# Patient Record
Sex: MALE | Race: WHITE | Employment: FULL TIME | URBAN - METROPOLITAN AREA
[De-identification: names, ages, dates, MRNs, and addresses within clinical notes are randomized per-mention and may not be internally consistent; named-entity substitution may affect disease eponyms.]

---

## 2017-08-06 ENCOUNTER — HOSPITAL ENCOUNTER (OUTPATIENT)
Age: 32
Discharge: HOME | End: 2017-08-06
Payer: COMMERCIAL

## 2017-08-06 VITALS
HEART RATE: 120 BPM | WEIGHT: 200 LBS | DIASTOLIC BLOOD PRESSURE: 106 MMHG | HEIGHT: 65 IN | RESPIRATION RATE: 16 BRPM | SYSTOLIC BLOOD PRESSURE: 138 MMHG | OXYGEN SATURATION: 97 % | BODY MASS INDEX: 33.32 KG/M2 | TEMPERATURE: 100 F

## 2017-08-06 DIAGNOSIS — A35 TETANUS: ICD-10-CM

## 2017-08-06 DIAGNOSIS — K12.1 MOUTH ULCERS: Primary | ICD-10-CM

## 2017-08-06 DIAGNOSIS — B34.9 VIRAL ILLNESS: ICD-10-CM

## 2017-08-06 PROCEDURE — 99203 OFFICE O/P NEW LOW 30 MIN: CPT

## 2017-08-06 PROCEDURE — 99204 OFFICE O/P NEW MOD 45 MIN: CPT

## 2017-08-06 PROCEDURE — 90471 IMMUNIZATION ADMIN: CPT

## 2017-08-06 NOTE — ED PROVIDER NOTES
Patient Seen in: [id number] [facility]    History   Patient presents with:   Body ache and/or chills  Dental Problem (dental)    Stated Complaint: body aches,jaw pain, pt states he stepped on nail-broke skin     42-year-old male who presents to All other systems reviewed and negative except as noted above. PSFH elements reviewed from today and agreed except as otherwise stated in HPI. Physical Exam   ED Triage Vitals [08/06/17 1639]  BP: (!) 138/106  Pulse: 120  Resp: 16  Temp: 99.7 °F (37. I discussed the diagnosis and need for followup with their primary care physician for further evaluation and care. Patient states they understand diagnosis, followup plan and agree with and understand  discharge instructions and plan.  I answered all of th

## 2017-08-06 NOTE — ED INITIAL ASSESSMENT (HPI)
Started on Friday with headache. Having body aches and back pain. Today started with jaw pain to lower jaw bilaterally. Hurts in lower teeth to bite down. Denies fevers. Patient states stepped on nail last week right foot. Patient unsure of last tetanus.

## 2017-08-10 ENCOUNTER — CHARTING TRANS (OUTPATIENT)
Dept: OTHER | Age: 32
End: 2017-08-10

## 2017-08-14 ENCOUNTER — LAB SERVICES (OUTPATIENT)
Dept: OTHER | Age: 32
End: 2017-08-14

## 2017-08-14 ENCOUNTER — IMAGING SERVICES (OUTPATIENT)
Dept: OTHER | Age: 32
End: 2017-08-14

## 2017-08-14 ENCOUNTER — CHARTING TRANS (OUTPATIENT)
Dept: FAMILY MEDICINE | Age: 32
End: 2017-08-14

## 2017-08-14 LAB
ALBUMIN SERPL BCG-MCNC: 4.5 G/DL (ref 3.6–5.1)
ALP SERPL-CCNC: 93 U/L (ref 30–130)
ALT SERPL W/O P-5'-P-CCNC: 86 U/L (ref 17–47)
AST SERPL-CCNC: 31 U/L (ref 14–43)
BILIRUB SERPL-MCNC: 0.7 MG/DL (ref 0–1.3)
BUN SERPL-MCNC: 19 MG/DL (ref 6–27)
CALCIUM SERPL-MCNC: 9.5 MG/DL (ref 8.6–10.6)
CHLORIDE SERPL-SCNC: 104 MMOL/L (ref 96–107)
CREATININE, SERUM: 1 MG/DL (ref 0.6–1.6)
DIFFERENTIAL TYPE: ABNORMAL
GFR SERPL CREATININE-BSD FRML MDRD: >60 ML/MIN/{1.73M2}
GFR SERPL CREATININE-BSD FRML MDRD: >60 ML/MIN/{1.73M2}
GLUCOSE SERPL-MCNC: 105 MG/DL (ref 70–200)
HCO3 SERPL-SCNC: 28 MMOL/L (ref 22–32)
HEMATOCRIT: 49.4 % (ref 40–51)
HEMOGLOBIN: 17.6 G/DL (ref 13.7–17.5)
LYMPH PERCENT: 31.4 % (ref 20.5–51.1)
LYMPHOCYTE ABSOLUTE #: 3 10*3/UL (ref 1.2–3.4)
MEAN CORPUSCULAR HGB CONCENTRATION: 35.6 % (ref 32–36)
MEAN CORPUSCULAR HGB: 29.6 PG (ref 27–34)
MEAN CORPUSCULAR VOLUME: 83.2 FL (ref 79–95)
MEAN PLATELET VOLUME: 11 FL (ref 8.6–12.4)
MIXED %: 12.8 % (ref 4.3–12.9)
MIXED ABSOLUTE #: 1.2 10*3/UL (ref 0.2–0.9)
NEUTROPHIL ABSOLUTE #: 5.3 10*3/UL (ref 1.4–6.5)
NEUTROPHIL PERCENT: 55.8 % (ref 34–73.5)
PLATELET COUNT: 276 10*3/UL (ref 150–400)
POTASSIUM SERPL-SCNC: 4.3 MMOL/L (ref 3.5–5.3)
PROT SERPL-MCNC: 7.6 G/DL (ref 6.4–8.5)
RED BLOOD CELL COUNT: 5.94 10*6/UL (ref 3.9–5.7)
RED CELL DISTRIBUTION WIDTH: 12.9 % (ref 11.3–14.8)
SODIUM SERPL-SCNC: 143 MMOL/L (ref 136–146)
TSH SERPL DL<=0.05 MIU/L-ACNC: 2.07 M[IU]/L (ref 0.3–4.82)
WHITE BLOOD CELL COUNT: 9.5 10*3/UL (ref 4–10)

## 2017-08-15 ENCOUNTER — CHARTING TRANS (OUTPATIENT)
Dept: OTHER | Age: 32
End: 2017-08-15

## 2017-08-16 ENCOUNTER — CHARTING TRANS (OUTPATIENT)
Dept: FAMILY MEDICINE | Age: 32
End: 2017-08-16

## 2017-08-18 ENCOUNTER — IMAGING SERVICES (OUTPATIENT)
Dept: OTHER | Age: 32
End: 2017-08-18

## 2017-08-18 ENCOUNTER — CHARTING TRANS (OUTPATIENT)
Dept: FAMILY MEDICINE | Age: 32
End: 2017-08-18

## 2017-08-19 ENCOUNTER — LAB SERVICES (OUTPATIENT)
Dept: OTHER | Age: 32
End: 2017-08-19

## 2017-08-19 ENCOUNTER — IMAGING SERVICES (OUTPATIENT)
Dept: OTHER | Age: 32
End: 2017-08-19

## 2017-08-19 LAB
ALBUMIN SERPL BCG-MCNC: 4.5 G/DL (ref 3.6–5.1)
ALP SERPL-CCNC: 95 U/L (ref 30–130)
ALT SERPL W/O P-5'-P-CCNC: 59 U/L (ref 17–47)
AST SERPL-CCNC: 29 U/L (ref 14–43)
BILIRUB SERPL-MCNC: 1.2 MG/DL (ref 0–1.3)
BUN SERPL-MCNC: 23 MG/DL (ref 6–27)
CALCIUM SERPL-MCNC: 9.1 MG/DL (ref 8.6–10.6)
CHLORIDE SERPL-SCNC: 105 MMOL/L (ref 96–107)
CHOLEST SERPL-MCNC: 174 MG/DL (ref 140–200)
CREATININE, SERUM: 1.1 MG/DL (ref 0.6–1.6)
DIFFERENTIAL TYPE: NORMAL
GFR SERPL CREATININE-BSD FRML MDRD: >60 ML/MIN/{1.73M2}
GFR SERPL CREATININE-BSD FRML MDRD: >60 ML/MIN/{1.73M2}
GLUCOSE SERPL-MCNC: 92 MG/DL (ref 70–200)
HCO3 SERPL-SCNC: 22 MMOL/L (ref 22–32)
HDLC SERPL-MCNC: 35 MG/DL
HEMATOCRIT: 46.4 % (ref 40–51)
HEMOGLOBIN: 16.5 G/DL (ref 13.7–17.5)
LDLC SERPL CALC-MCNC: 110 MG/DL (ref 30–100)
LYMPH PERCENT: 36.4 % (ref 20.5–51.1)
LYMPHOCYTE ABSOLUTE #: 3.1 10*3/UL (ref 1.2–3.4)
MEAN CORPUSCULAR HGB CONCENTRATION: 35.6 % (ref 32–36)
MEAN CORPUSCULAR HGB: 29.8 PG (ref 27–34)
MEAN CORPUSCULAR VOLUME: 83.9 FL (ref 79–95)
MEAN PLATELET VOLUME: 10.9 FL (ref 8.6–12.4)
MIXED %: 10 % (ref 4.3–12.9)
MIXED ABSOLUTE #: 0.9 10*3/UL (ref 0.2–0.9)
NEUTROPHIL ABSOLUTE #: 4.5 10*3/UL (ref 1.4–6.5)
NEUTROPHIL PERCENT: 53.6 % (ref 34–73.5)
PLATELET COUNT: 298 10*3/UL (ref 150–400)
POTASSIUM SERPL-SCNC: 4.6 MMOL/L (ref 3.5–5.3)
PROT SERPL-MCNC: 7.5 G/DL (ref 6.4–8.5)
RED BLOOD CELL COUNT: 5.53 10*6/UL (ref 3.9–5.7)
RED CELL DISTRIBUTION WIDTH: 12.6 % (ref 11.3–14.8)
SODIUM SERPL-SCNC: 142 MMOL/L (ref 136–146)
TRIGL SERPL-MCNC: 144 MG/DL (ref 0–200)
WHITE BLOOD CELL COUNT: 8.5 10*3/UL (ref 4–10)

## 2017-08-21 LAB
ALBUMIN/CREAT UR: 8 MG/G (ref 0–30)
CREAT UR-MCNC: 316.3 MG/DL
MICROALBUMIN UR-MCNC: 25.5 MG/L

## 2017-09-01 ENCOUNTER — CHARTING TRANS (OUTPATIENT)
Dept: FAMILY MEDICINE | Age: 32
End: 2017-09-01

## 2017-09-01 ENCOUNTER — MYAURORA ACCOUNT LINK (OUTPATIENT)
Dept: OTHER | Age: 32
End: 2017-09-01

## 2017-09-11 ENCOUNTER — CHARTING TRANS (OUTPATIENT)
Dept: FAMILY MEDICINE | Age: 32
End: 2017-09-11

## 2017-09-24 ENCOUNTER — CHARTING TRANS (OUTPATIENT)
Dept: OTHER | Age: 32
End: 2017-09-24

## 2017-11-02 ENCOUNTER — CHARTING TRANS (OUTPATIENT)
Dept: OTHER | Age: 32
End: 2017-11-02

## 2018-03-15 ENCOUNTER — CHARTING TRANS (OUTPATIENT)
Dept: OTHER | Age: 33
End: 2018-03-15

## 2018-03-15 ENCOUNTER — HOSPITAL ENCOUNTER (EMERGENCY)
Age: 33
Discharge: HOME | End: 2018-03-15
Attending: EMERGENCY MEDICINE

## 2018-03-15 VITALS
TEMPERATURE: 97.9 F | RESPIRATION RATE: 16 BRPM | WEIGHT: 200 LBS | DIASTOLIC BLOOD PRESSURE: 111 MMHG | SYSTOLIC BLOOD PRESSURE: 164 MMHG | HEART RATE: 88 BPM | OXYGEN SATURATION: 96 % | BODY MASS INDEX: 33.32 KG/M2 | HEIGHT: 65 IN

## 2018-03-15 DIAGNOSIS — R51.9 NONINTRACTABLE HEADACHE, UNSPECIFIED CHRONICITY PATTERN, UNSPECIFIED HEADACHE TYPE: ICD-10-CM

## 2018-03-15 DIAGNOSIS — R42 DIZZINESS: ICD-10-CM

## 2018-03-15 DIAGNOSIS — R03.0 ELEVATED BLOOD PRESSURE READING: Primary | ICD-10-CM

## 2018-03-15 LAB
ANION GAP BLD CALC-SCNC: 16 MMOL/L
BUN BLD-MCNC: 20 MG/DL (ref 6–20)
CA-I BLD ISE-SCNC: 1.13 MMOL/L (ref 1.15–1.29)
CHLORIDE BLD-SCNC: 104 MMOL/L (ref 98–107)
CO2 BLD-SCNC: 24 MMOL/L (ref 19–24)
CREAT BLD-MCNC: 0.9 MG/DL (ref 0.67–1.17)
CREAT BLD-MCNC: >90 MG/DL
GLUCOSE BLD-MCNC: 93 MG/DL (ref 65–99)
HCT VFR BLD CALC: 48 % (ref 39–51)
HGB BLD-MCNC: 16.3 G/DL (ref 13–17)
POTASSIUM BLD-SCNC: 3.8 MMOL/L (ref 3.4–5.1)
SODIUM BLD-SCNC: 139 MMOL/L (ref 135–145)
TROPONIN I BLD-MCNC: <0.1 NG/ML

## 2018-03-15 PROCEDURE — 36415 COLL VENOUS BLD VENIPUNCTURE: CPT

## 2018-03-15 PROCEDURE — 84484 ASSAY OF TROPONIN QUANT: CPT

## 2018-03-15 PROCEDURE — 10002803 HB RX 637: Performed by: PHYSICIAN ASSISTANT

## 2018-03-15 PROCEDURE — 80047 BASIC METABLC PNL IONIZED CA: CPT

## 2018-03-15 PROCEDURE — 99284 EMERGENCY DEPT VISIT MOD MDM: CPT

## 2018-03-15 RX ORDER — LISINOPRIL 10 MG/1
20 TABLET ORAL ONCE
Status: COMPLETED | OUTPATIENT
Start: 2018-03-15 | End: 2018-03-15

## 2018-03-15 RX ADMIN — LISINOPRIL 20 MG: 10 TABLET ORAL at 10:20

## 2018-03-15 ASSESSMENT — ENCOUNTER SYMPTOMS
DIAPHORESIS: 0
DIZZINESS: 1
DIARRHEA: 0
ABDOMINAL PAIN: 0
NERVOUS/ANXIOUS: 0
VOMITING: 0
CONFUSION: 0
HEADACHES: 1
BLOOD IN STOOL: 0
NUMBNESS: 0
ACTIVITY CHANGE: 0
CHEST TIGHTNESS: 0
EYE REDNESS: 0
FEVER: 0
EYE DISCHARGE: 0
BACK PAIN: 0
NAUSEA: 0
RHINORRHEA: 0
SORE THROAT: 0
SHORTNESS OF BREATH: 0
LIGHT-HEADEDNESS: 1
EYE ITCHING: 0
WEAKNESS: 1
COUGH: 0
VOICE CHANGE: 0

## 2018-03-15 ASSESSMENT — PAIN SCALES - GENERAL: PAINLEVEL_OUTOF10: 6

## 2018-03-15 ASSESSMENT — MOVEMENT AND STRENGTH ASSESSMENTS: FACE_JAW: NO FACIAL DROOP

## 2018-04-06 ENCOUNTER — CHARTING TRANS (OUTPATIENT)
Dept: OTHER | Age: 33
End: 2018-04-06

## 2018-04-06 ENCOUNTER — MYAURORA ACCOUNT LINK (OUTPATIENT)
Dept: OTHER | Age: 33
End: 2018-04-06

## 2018-04-06 ENCOUNTER — LAB SERVICES (OUTPATIENT)
Dept: OTHER | Age: 33
End: 2018-04-06

## 2018-04-06 LAB
ALBUMIN SERPL BCG-MCNC: 4.7 G/DL (ref 3.6–5.1)
ALP SERPL-CCNC: 84 U/L (ref 30–130)
ALT SERPL W/O P-5'-P-CCNC: 69 U/L (ref 17–47)
AST SERPL-CCNC: 33 U/L (ref 14–43)
BILIRUB SERPL-MCNC: 0.7 MG/DL (ref 0–1.3)
BUN SERPL-MCNC: 20 MG/DL (ref 6–27)
CALCIUM SERPL-MCNC: 9.9 MG/DL (ref 8.6–10.6)
CHLORIDE SERPL-SCNC: 102 MMOL/L (ref 96–107)
CREATININE, SERUM: 0.9 MG/DL (ref 0.6–1.6)
GFR SERPL CREATININE-BSD FRML MDRD: >60 ML/MIN/{1.73M2}
GFR SERPL CREATININE-BSD FRML MDRD: >60 ML/MIN/{1.73M2}
GLUCOSE SERPL-MCNC: 92 MG/DL (ref 70–200)
HCO3 SERPL-SCNC: 26 MMOL/L (ref 22–32)
POTASSIUM SERPL-SCNC: 4.3 MMOL/L (ref 3.5–5.3)
PROT SERPL-MCNC: 7.3 G/DL (ref 6.4–8.5)
SODIUM SERPL-SCNC: 141 MMOL/L (ref 136–146)

## 2018-04-16 ENCOUNTER — CHARTING TRANS (OUTPATIENT)
Dept: OTHER | Age: 33
End: 2018-04-16

## 2018-06-05 ENCOUNTER — CHARTING TRANS (OUTPATIENT)
Dept: OTHER | Age: 33
End: 2018-06-05

## 2018-06-05 ENCOUNTER — MYAURORA ACCOUNT LINK (OUTPATIENT)
Dept: OTHER | Age: 33
End: 2018-06-05

## 2018-06-05 ASSESSMENT — PAIN SCALES - GENERAL: PAINLEVEL_OUTOF10: 5

## 2018-06-06 ENCOUNTER — CHARTING TRANS (OUTPATIENT)
Dept: OTHER | Age: 33
End: 2018-06-06

## 2018-11-13 ENCOUNTER — CHARTING TRANS (OUTPATIENT)
Dept: OTHER | Age: 33
End: 2018-11-13

## 2018-11-23 ENCOUNTER — IMAGING SERVICES (OUTPATIENT)
Dept: OTHER | Age: 33
End: 2018-11-23

## 2018-11-28 VITALS
RESPIRATION RATE: 16 BRPM | DIASTOLIC BLOOD PRESSURE: 86 MMHG | HEART RATE: 72 BPM | HEIGHT: 64 IN | WEIGHT: 205 LBS | TEMPERATURE: 97.3 F | BODY MASS INDEX: 35 KG/M2 | SYSTOLIC BLOOD PRESSURE: 140 MMHG

## 2018-11-28 VITALS
SYSTOLIC BLOOD PRESSURE: 136 MMHG | RESPIRATION RATE: 18 BRPM | TEMPERATURE: 97.9 F | WEIGHT: 204 LBS | HEART RATE: 116 BPM | HEIGHT: 64 IN | DIASTOLIC BLOOD PRESSURE: 92 MMHG | BODY MASS INDEX: 34.83 KG/M2

## 2018-11-28 VITALS
HEART RATE: 116 BPM | WEIGHT: 205 LBS | BODY MASS INDEX: 35 KG/M2 | TEMPERATURE: 97.4 F | RESPIRATION RATE: 16 BRPM | SYSTOLIC BLOOD PRESSURE: 152 MMHG | HEIGHT: 64 IN | DIASTOLIC BLOOD PRESSURE: 110 MMHG

## 2018-11-28 VITALS
TEMPERATURE: 97.5 F | RESPIRATION RATE: 16 BRPM | HEART RATE: 68 BPM | DIASTOLIC BLOOD PRESSURE: 102 MMHG | WEIGHT: 202 LBS | SYSTOLIC BLOOD PRESSURE: 138 MMHG

## 2018-11-28 VITALS
SYSTOLIC BLOOD PRESSURE: 134 MMHG | DIASTOLIC BLOOD PRESSURE: 94 MMHG | WEIGHT: 206 LBS | HEART RATE: 80 BPM | TEMPERATURE: 98.1 F | RESPIRATION RATE: 18 BRPM

## 2019-01-24 ENCOUNTER — IMAGING SERVICES (OUTPATIENT)
Dept: OTHER | Age: 34
End: 2019-01-24

## 2019-02-26 RX ORDER — LISINOPRIL 20 MG/1
TABLET ORAL
Qty: 30 TABLET | Refills: 1 | Status: SHIPPED | OUTPATIENT
Start: 2019-02-26 | End: 2019-04-29 | Stop reason: ALTCHOICE

## 2019-03-05 VITALS
WEIGHT: 205 LBS | HEART RATE: 94 BPM | SYSTOLIC BLOOD PRESSURE: 138 MMHG | BODY MASS INDEX: 34.11 KG/M2 | OXYGEN SATURATION: 97 % | TEMPERATURE: 98.3 F | DIASTOLIC BLOOD PRESSURE: 98 MMHG

## 2019-03-05 VITALS
OXYGEN SATURATION: 96 % | TEMPERATURE: 97.4 F | RESPIRATION RATE: 16 BRPM | DIASTOLIC BLOOD PRESSURE: 90 MMHG | SYSTOLIC BLOOD PRESSURE: 138 MMHG | HEART RATE: 82 BPM

## 2019-03-06 VITALS
WEIGHT: 203 LBS | TEMPERATURE: 97.5 F | SYSTOLIC BLOOD PRESSURE: 122 MMHG | HEART RATE: 72 BPM | BODY MASS INDEX: 33.78 KG/M2 | DIASTOLIC BLOOD PRESSURE: 94 MMHG | RESPIRATION RATE: 16 BRPM

## 2019-04-29 ENCOUNTER — LAB SERVICES (OUTPATIENT)
Dept: LAB | Age: 34
End: 2019-04-29

## 2019-04-29 ENCOUNTER — OFFICE VISIT (OUTPATIENT)
Dept: INTERNAL MEDICINE | Age: 34
End: 2019-04-29

## 2019-04-29 VITALS
TEMPERATURE: 98.1 F | HEART RATE: 62 BPM | WEIGHT: 228.2 LBS | BODY MASS INDEX: 38.02 KG/M2 | RESPIRATION RATE: 16 BRPM | DIASTOLIC BLOOD PRESSURE: 96 MMHG | SYSTOLIC BLOOD PRESSURE: 128 MMHG | HEIGHT: 65 IN

## 2019-04-29 DIAGNOSIS — R42 DIZZINESS: ICD-10-CM

## 2019-04-29 DIAGNOSIS — R51.9 NONINTRACTABLE HEADACHE, UNSPECIFIED CHRONICITY PATTERN, UNSPECIFIED HEADACHE TYPE: Primary | ICD-10-CM

## 2019-04-29 DIAGNOSIS — R51.9 NONINTRACTABLE HEADACHE, UNSPECIFIED CHRONICITY PATTERN, UNSPECIFIED HEADACHE TYPE: ICD-10-CM

## 2019-04-29 LAB
ALBUMIN SERPL-MCNC: 4.7 G/DL (ref 3.6–5.1)
ALP SERPL-CCNC: 70 U/L (ref 45–115)
ALT SERPL W/O P-5'-P-CCNC: 114 U/L (ref 5–49)
AST SERPL-CCNC: 55 U/L (ref 14–43)
BILIRUB SERPL-MCNC: 0.6 MG/DL (ref 0–1.3)
BUN SERPL-MCNC: 19 MG/DL (ref 6–27)
CALCIUM SERPL-MCNC: 9.6 MG/DL (ref 8.6–10.6)
CHLORIDE SERPL-SCNC: 105 MMOL/L (ref 96–107)
CO2 SERPL-SCNC: 26 MMOL/L (ref 22–32)
CREAT SERPL-MCNC: 1 MG/DL (ref 0.6–1.6)
GFR SERPL CREATININE-BSD FRML MDRD: >60 ML/MIN/{1.73M2}
GFR SERPL CREATININE-BSD FRML MDRD: >60 ML/MIN/{1.73M2}
GLUCOSE SERPL-MCNC: 100 MG/DL (ref 70–200)
HEMATOCRIT: 47.7 % (ref 40–51)
HEMOGLOBIN: 16.9 G/DL (ref 13.7–17.5)
MEAN CORPUSCULAR HGB CONCENTRATION: 35.4 % (ref 32–36)
MEAN CORPUSCULAR HGB: 30.6 PG (ref 27–34)
MEAN CORPUSCULAR VOLUME: 86.4 FL (ref 79–95)
MEAN PLATELET VOLUME: 11.6 FL (ref 8.6–12.4)
PLATELET COUNT: 196 10*3/UL (ref 150–400)
POTASSIUM SERPL-SCNC: 4.5 MMOL/L (ref 3.5–5.3)
PROT SERPL-MCNC: 7.4 G/DL (ref 6.4–8.5)
RED BLOOD CELL COUNT: 5.52 10*6/UL (ref 3.9–5.7)
RED CELL DISTRIBUTION WIDTH: 13.1 % (ref 11.3–14.8)
SODIUM SERPL-SCNC: 141 MMOL/L (ref 136–146)
WHITE BLOOD CELL COUNT: 6.9 10*3/UL (ref 4–10)

## 2019-04-29 PROCEDURE — 99203 OFFICE O/P NEW LOW 30 MIN: CPT | Performed by: INTERNAL MEDICINE

## 2019-04-29 PROCEDURE — 36415 COLL VENOUS BLD VENIPUNCTURE: CPT | Performed by: INTERNAL MEDICINE

## 2019-04-29 PROCEDURE — 85027 COMPLETE CBC AUTOMATED: CPT | Performed by: INTERNAL MEDICINE

## 2019-04-29 PROCEDURE — 80053 COMPREHEN METABOLIC PANEL: CPT | Performed by: INTERNAL MEDICINE

## 2019-04-29 RX ORDER — ESCITALOPRAM OXALATE 10 MG/1
10 TABLET ORAL
COMMUNITY
Start: 2018-04-06 | End: 2019-06-28 | Stop reason: SDUPTHER

## 2019-04-29 RX ORDER — HYDROCHLOROTHIAZIDE 12.5 MG/1
CAPSULE, GELATIN COATED ORAL
COMMUNITY
Start: 2018-04-06 | End: 2019-06-28 | Stop reason: SDUPTHER

## 2019-04-29 RX ORDER — LISINOPRIL 20 MG/1
TABLET ORAL
COMMUNITY
Start: 2018-04-06 | End: 2019-09-14 | Stop reason: SDUPTHER

## 2019-04-29 ASSESSMENT — PATIENT HEALTH QUESTIONNAIRE - PHQ9
SUM OF ALL RESPONSES TO PHQ9 QUESTIONS 1 TO 9: 6
SUM OF ALL RESPONSES TO PHQ9 QUESTIONS 1 AND 2: 1
7. TROUBLE CONCENTRATING ON THINGS, SUCH AS READING THE NEWSPAPER OR WATCHING TELEVISION: NOT AT ALL
SUM OF ALL RESPONSES TO PHQ QUESTIONS 1-9: 6
3. TROUBLE FALLING OR STAYING ASLEEP OR SLEEPING TOO MUCH: SEVERAL DAYS
4. FEELING TIRED OR HAVING LITTLE ENERGY: SEVERAL DAYS
CLINICAL INTERPRETATION OF PHQ9 SCORE: MILD DEPRESSION
9. THOUGHTS THAT YOU WOULD BE BETTER OFF DEAD, OR OF HURTING YOURSELF: NOT AT ALL
1. LITTLE INTEREST OR PLEASURE IN DOING THINGS: SEVERAL DAYS
5. POOR APPETITE, WEIGHT LOSS, OR OVEREATING: MORE THAN HALF THE DAYS
2. FEELING DOWN, DEPRESSED OR HOPELESS: NOT AT ALL
SUM OF ALL RESPONSES TO PHQ9 QUESTIONS 1 AND 2: 1
8. MOVING OR SPEAKING SO SLOWLY THAT OTHER PEOPLE COULD HAVE NOTICED. OR THE OPPOSITE, BEING SO FIGETY OR RESTLESS THAT YOU HAVE BEEN MOVING AROUND A LOT MORE THAN USUAL: SEVERAL DAYS
6. FEELING BAD ABOUT YOURSELF - OR THAT YOU ARE A FAILURE OR HAVE LET YOURSELF OR YOUR FAMILY DOWN: NOT AT ALL

## 2019-04-29 ASSESSMENT — ANXIETY QUESTIONNAIRES
3. WORRYING TOO MUCH ABOUT DIFFERENT THINGS: NOT AT ALL
6. BECOMING EASILY ANNOYED OR IRRITABLE: 1
4. TROUBLE RELAXING: SEVERAL DAYS
6. BECOMING EASILY ANNOYED OR IRRITABLE: SEVERAL DAYS
5. BEING SO RESTLESS THAT IT IS HARD TO SIT STILL: 1
2. NOT BEING ABLE TO STOP OR CONTROL WORRYING: 0
3. WORRYING TOO MUCH ABOUT DIFFERENT THINGS: 0
2. NOT BEING ABLE TO STOP OR CONTROL WORRYING: NOT AT ALL
7. FEELING AFRAID AS IF SOMETHING AWFUL MIGHT HAPPEN: 0
1. FEELING NERVOUS, ANXIOUS, OR ON EDGE: 1
5. BEING SO RESTLESS THAT IT IS HARD TO SIT STILL: SEVERAL DAYS
GAD7 TOTAL SCORE: 4
7. FEELING AFRAID AS IF SOMETHING AWFUL MIGHT HAPPEN: NOT AT ALL
4. TROUBLE RELAXING: 1
1. FEELING NERVOUS, ANXIOUS, OR ON EDGE: SEVERAL DAYS

## 2019-05-01 ENCOUNTER — TELEPHONE (OUTPATIENT)
Dept: FAMILY MEDICINE | Age: 34
End: 2019-05-01

## 2019-08-05 RX ORDER — HYDROCHLOROTHIAZIDE 12.5 MG/1
CAPSULE, GELATIN COATED ORAL
Qty: 90 CAPSULE | Refills: 0 | Status: SHIPPED | OUTPATIENT
Start: 2019-08-05 | End: 2019-09-14 | Stop reason: DRUGHIGH

## 2019-08-05 RX ORDER — ESCITALOPRAM OXALATE 10 MG/1
TABLET ORAL
Qty: 90 TABLET | Refills: 0 | Status: SHIPPED | OUTPATIENT
Start: 2019-08-05 | End: 2020-11-07 | Stop reason: SDUPTHER

## 2019-08-07 RX ORDER — LISINOPRIL 20 MG/1
TABLET ORAL
Qty: 30 TABLET | Refills: 0 | Status: SHIPPED | OUTPATIENT
Start: 2019-08-07 | End: 2019-08-30 | Stop reason: SDUPTHER

## 2019-08-29 ENCOUNTER — APPOINTMENT (OUTPATIENT)
Dept: INTERNAL MEDICINE | Age: 34
End: 2019-08-29

## 2019-08-30 RX ORDER — LISINOPRIL 20 MG/1
TABLET ORAL
Qty: 30 TABLET | Refills: 2 | Status: SHIPPED | OUTPATIENT
Start: 2019-08-30 | End: 2019-09-14 | Stop reason: SDUPTHER

## 2019-09-14 ENCOUNTER — OFFICE VISIT (OUTPATIENT)
Dept: INTERNAL MEDICINE | Age: 34
End: 2019-09-14

## 2019-09-14 VITALS
RESPIRATION RATE: 18 BRPM | HEART RATE: 82 BPM | TEMPERATURE: 97.7 F | BODY MASS INDEX: 38.15 KG/M2 | WEIGHT: 229 LBS | SYSTOLIC BLOOD PRESSURE: 152 MMHG | DIASTOLIC BLOOD PRESSURE: 104 MMHG | HEIGHT: 65 IN

## 2019-09-14 DIAGNOSIS — I10 ESSENTIAL HYPERTENSION: ICD-10-CM

## 2019-09-14 DIAGNOSIS — E66.01 CLASS 2 SEVERE OBESITY DUE TO EXCESS CALORIES WITH SERIOUS COMORBIDITY AND BODY MASS INDEX (BMI) OF 38.0 TO 38.9 IN ADULT (CMD): ICD-10-CM

## 2019-09-14 DIAGNOSIS — Z00.01 ENCOUNTER FOR GENERAL ADULT MEDICAL EXAMINATION WITH ABNORMAL FINDINGS: Primary | ICD-10-CM

## 2019-09-14 DIAGNOSIS — R06.83 SNORING: ICD-10-CM

## 2019-09-14 DIAGNOSIS — R01.1 HEART MURMUR: ICD-10-CM

## 2019-09-14 PROCEDURE — 99395 PREV VISIT EST AGE 18-39: CPT | Performed by: INTERNAL MEDICINE

## 2019-09-14 PROCEDURE — 99214 OFFICE O/P EST MOD 30 MIN: CPT | Performed by: INTERNAL MEDICINE

## 2019-09-14 RX ORDER — LISINOPRIL 40 MG/1
40 TABLET ORAL DAILY
Qty: 90 TABLET | Refills: 1 | Status: SHIPPED | OUTPATIENT
Start: 2019-09-14 | End: 2020-11-07 | Stop reason: SDUPTHER

## 2019-09-14 RX ORDER — HYDROCHLOROTHIAZIDE 25 MG/1
25 TABLET ORAL DAILY
Qty: 90 TABLET | Refills: 1 | Status: SHIPPED | OUTPATIENT
Start: 2019-09-14 | End: 2020-11-07 | Stop reason: SDUPTHER

## 2019-09-14 SDOH — HEALTH STABILITY: MENTAL HEALTH: HOW OFTEN DO YOU HAVE 6 OR MORE DRINKS ON ONE OCCASION?: LESS THAN MONTHLY

## 2019-10-04 ENCOUNTER — OFFICE VISIT (OUTPATIENT)
Dept: PULMONOLOGY | Age: 34
End: 2019-10-04
Attending: INTERNAL MEDICINE

## 2019-10-04 VITALS
DIASTOLIC BLOOD PRESSURE: 92 MMHG | RESPIRATION RATE: 18 BRPM | HEIGHT: 65 IN | TEMPERATURE: 99.2 F | OXYGEN SATURATION: 97 % | SYSTOLIC BLOOD PRESSURE: 142 MMHG | HEART RATE: 98 BPM | BODY MASS INDEX: 38.22 KG/M2 | WEIGHT: 229.4 LBS

## 2019-10-04 DIAGNOSIS — R06.83 SNORING: ICD-10-CM

## 2019-10-04 PROCEDURE — 99243 OFF/OP CNSLTJ NEW/EST LOW 30: CPT | Performed by: INTERNAL MEDICINE

## 2019-10-04 ASSESSMENT — SLEEP AND FATIGUE QUESTIONNAIRES
HOW LIKELY ARE YOU TO NOD OFF OR FALL ASLEEP WHILE SITTING AND READING: WOULD NEVER DOZE
HOW LIKELY ARE YOU TO NOD OFF OR FALL ASLEEP WHILE WATCHING TV: HIGH CHANCE OF DOZING
HOW LIKELY ARE YOU TO NOD OFF OR FALL ASLEEP IN A CAR, WHILE STOPPED FOR A FEW MINUTES IN TRAFFIC: WOULD NEVER DOZE
HOW LIKELY ARE YOU TO NOD OFF OR FALL ASLEEP WHILE SITTING QUIETLY AFTER LUNCH WITHOUT ALCOHOL: WOULD NEVER DOZE
HOW LIKELY ARE YOU TO NOD OFF OR FALL ASLEEP WHEN YOU ARE A PASSENGER IN A CAR FOR AN HOUR WITHOUT A BREAK: WOULD NEVER DOZE
HOW LIKELY ARE YOU TO NOD OFF OR FALL ASLEEP WHILE SITTING AND TALKING TO SOMEONE: WOULD NEVER DOZE
HOW LIKELY ARE YOU TO NOD OFF OR FALL ASLEEP WHILE SITTING INACTIVE IN A PUBLIC PLACE: WOULD NEVER DOZE
HOW LIKELY ARE YOU TO NOD OFF OR FALL ASLEEP WHILE LYING DOWN TO REST IN THE AFTERNOON WHEN CIRCUMSTANCES PERMIT: HIGH CHANCE OF DOZING
ESS TOTAL SCORE: 6

## 2019-10-10 DIAGNOSIS — G47.33 OBSTRUCTIVE SLEEP APNEA: Primary | ICD-10-CM

## 2019-10-30 ENCOUNTER — ANCILLARY PROCEDURE (OUTPATIENT)
Dept: CARDIOLOGY | Age: 34
End: 2019-10-30
Attending: INTERNAL MEDICINE

## 2019-10-30 DIAGNOSIS — R01.1 HEART MURMUR: ICD-10-CM

## 2019-10-30 PROCEDURE — 93306 TTE W/DOPPLER COMPLETE: CPT | Performed by: INTERNAL MEDICINE

## 2019-11-19 ENCOUNTER — OFFICE VISIT (OUTPATIENT)
Dept: INTERNAL MEDICINE | Age: 34
End: 2019-11-19

## 2019-11-19 VITALS
SYSTOLIC BLOOD PRESSURE: 140 MMHG | OXYGEN SATURATION: 97 % | TEMPERATURE: 97.5 F | HEART RATE: 98 BPM | DIASTOLIC BLOOD PRESSURE: 86 MMHG | RESPIRATION RATE: 20 BRPM

## 2019-11-19 DIAGNOSIS — J40 BRONCHITIS: Primary | ICD-10-CM

## 2019-11-19 PROCEDURE — 99214 OFFICE O/P EST MOD 30 MIN: CPT | Performed by: PHYSICIAN ASSISTANT

## 2019-11-19 RX ORDER — CODEINE PHOSPHATE AND GUAIFENESIN 10; 100 MG/5ML; MG/5ML
5 SOLUTION ORAL 3 TIMES DAILY PRN
Qty: 120 ML | Refills: 0 | Status: SHIPPED | OUTPATIENT
Start: 2019-11-19 | End: 2020-11-07 | Stop reason: ALTCHOICE

## 2019-11-19 RX ORDER — AZITHROMYCIN 250 MG/1
TABLET, FILM COATED ORAL
Qty: 6 TABLET | Refills: 0 | Status: SHIPPED | OUTPATIENT
Start: 2019-11-19 | End: 2019-11-24

## 2019-11-19 SDOH — HEALTH STABILITY: MENTAL HEALTH: HOW OFTEN DO YOU HAVE 6 OR MORE DRINKS ON ONE OCCASION?: LESS THAN MONTHLY

## 2020-10-15 ENCOUNTER — OFFICE VISIT (OUTPATIENT)
Dept: INTERNAL MEDICINE | Age: 35
End: 2020-10-15

## 2020-10-15 VITALS — TEMPERATURE: 97.7 F | SYSTOLIC BLOOD PRESSURE: 164 MMHG | HEART RATE: 80 BPM | DIASTOLIC BLOOD PRESSURE: 98 MMHG

## 2020-10-15 DIAGNOSIS — M54.41 ACUTE BILATERAL LOW BACK PAIN WITH RIGHT-SIDED SCIATICA: Primary | ICD-10-CM

## 2020-10-15 PROCEDURE — 99214 OFFICE O/P EST MOD 30 MIN: CPT | Performed by: PHYSICIAN ASSISTANT

## 2020-10-15 RX ORDER — PREDNISONE 50 MG/1
50 TABLET ORAL DAILY
Qty: 5 TABLET | Refills: 0 | Status: SHIPPED | OUTPATIENT
Start: 2020-10-15 | End: 2020-11-07 | Stop reason: ALTCHOICE

## 2020-10-15 ASSESSMENT — PATIENT HEALTH QUESTIONNAIRE - PHQ9
SUM OF ALL RESPONSES TO PHQ9 QUESTIONS 1 AND 2: 0
CLINICAL INTERPRETATION OF PHQ2 SCORE: NO FURTHER SCREENING NEEDED
1. LITTLE INTEREST OR PLEASURE IN DOING THINGS: NOT AT ALL
SUM OF ALL RESPONSES TO PHQ9 QUESTIONS 1 AND 2: 0
CLINICAL INTERPRETATION OF PHQ9 SCORE: NO FURTHER SCREENING NEEDED
2. FEELING DOWN, DEPRESSED OR HOPELESS: NOT AT ALL

## 2020-11-03 PROBLEM — G89.29 CHRONIC RIGHT-SIDED LOW BACK PAIN WITH RIGHT-SIDED SCIATICA: Status: ACTIVE | Noted: 2020-11-03

## 2020-11-03 PROBLEM — M62.89 MUSCLE TIGHTNESS: Status: ACTIVE | Noted: 2020-11-03

## 2020-11-03 PROBLEM — M54.41 CHRONIC RIGHT-SIDED LOW BACK PAIN WITH RIGHT-SIDED SCIATICA: Status: ACTIVE | Noted: 2020-11-03

## 2020-11-03 PROBLEM — M62.81 MUSCLE WEAKNESS: Status: ACTIVE | Noted: 2020-11-03

## 2020-11-07 ENCOUNTER — OFFICE VISIT (OUTPATIENT)
Dept: INTERNAL MEDICINE | Age: 35
End: 2020-11-07

## 2020-11-07 ENCOUNTER — LAB SERVICES (OUTPATIENT)
Dept: LAB | Age: 35
End: 2020-11-07

## 2020-11-07 VITALS
HEART RATE: 58 BPM | SYSTOLIC BLOOD PRESSURE: 156 MMHG | RESPIRATION RATE: 18 BRPM | HEIGHT: 65 IN | WEIGHT: 223.7 LBS | TEMPERATURE: 97.7 F | DIASTOLIC BLOOD PRESSURE: 100 MMHG | BODY MASS INDEX: 37.27 KG/M2

## 2020-11-07 DIAGNOSIS — E66.01 CLASS 2 SEVERE OBESITY DUE TO EXCESS CALORIES WITH SERIOUS COMORBIDITY AND BODY MASS INDEX (BMI) OF 37.0 TO 37.9 IN ADULT (CMD): ICD-10-CM

## 2020-11-07 DIAGNOSIS — I10 ESSENTIAL HYPERTENSION: ICD-10-CM

## 2020-11-07 DIAGNOSIS — R06.83 SNORING: ICD-10-CM

## 2020-11-07 DIAGNOSIS — Z00.00 HEALTHCARE MAINTENANCE: Primary | ICD-10-CM

## 2020-11-07 DIAGNOSIS — F41.8 DEPRESSION WITH ANXIETY: ICD-10-CM

## 2020-11-07 DIAGNOSIS — Z00.00 HEALTHCARE MAINTENANCE: ICD-10-CM

## 2020-11-07 PROBLEM — E78.6 LOW HDL (UNDER 40): Status: ACTIVE | Noted: 2017-09-11

## 2020-11-07 PROBLEM — R74.8 ELEVATED LIVER ENZYMES: Status: ACTIVE | Noted: 2017-09-11

## 2020-11-07 LAB
ALBUMIN SERPL-MCNC: 4.6 G/DL (ref 3.6–5.1)
ALP SERPL-CCNC: 103 U/L (ref 45–115)
ALT SERPL W/O P-5'-P-CCNC: 92 U/L (ref 5–49)
AST SERPL-CCNC: 54 U/L (ref 14–43)
BILIRUB SERPL-MCNC: 1.1 MG/DL (ref 0–1.3)
BUN SERPL-MCNC: 15 MG/DL (ref 6–27)
CALCIUM SERPL-MCNC: 9.3 MG/DL (ref 8.6–10.6)
CHLORIDE SERPL-SCNC: 103 MMOL/L (ref 96–107)
CHOLEST SERPL-MCNC: 216 MG/DL (ref 140–200)
CO2 SERPL-SCNC: 27 MMOL/L (ref 22–32)
CREAT SERPL-MCNC: 1 MG/DL (ref 0.6–1.6)
EST. AVERAGE GLUCOSE BLD GHB EST-MCNC: 100 MG/DL (ref 0–154)
GFR SERPL CREATININE-BSD FRML MDRD: >60 ML/MIN/{1.73M2}
GFR SERPL CREATININE-BSD FRML MDRD: >60 ML/MIN/{1.73M2}
GLUCOSE P FAST SERPL-MCNC: 96 MG/DL (ref 60–100)
HBA1C MFR BLD: 5.1 % (ref 4.2–6)
HDLC SERPL-MCNC: 43 MG/DL
HEMATOCRIT: 50.1 % (ref 40–51)
HEMOGLOBIN: 17.5 G/DL (ref 13.7–17.5)
LDLC SERPL CALC-MCNC: 142 MG/DL (ref 30–100)
MEAN CORPUSCULAR HGB CONCENTRATION: 34.9 % (ref 32–36)
MEAN CORPUSCULAR HGB: 30.2 PG (ref 27–34)
MEAN CORPUSCULAR VOLUME: 86.4 FL (ref 79–95)
MEAN PLATELET VOLUME: 12.1 FL (ref 8.6–12.4)
PLATELET COUNT: 282 10*3/UL (ref 150–400)
POTASSIUM SERPL-SCNC: 3.7 MMOL/L (ref 3.5–5.3)
PROT SERPL-MCNC: 8 G/DL (ref 6.4–8.5)
RED BLOOD CELL COUNT: 5.8 10*6/UL (ref 3.9–5.7)
RED CELL DISTRIBUTION WIDTH: 12.8 % (ref 11.3–14.8)
SODIUM SERPL-SCNC: 139 MMOL/L (ref 136–146)
TRIGL SERPL-MCNC: 155 MG/DL (ref 0–200)
TSH SERPL DL<=0.05 MIU/L-ACNC: 1.64 M[IU]/L (ref 0.3–4.82)
WHITE BLOOD CELL COUNT: 8.3 10*3/UL (ref 4–10)

## 2020-11-07 PROCEDURE — 85027 COMPLETE CBC AUTOMATED: CPT | Performed by: INTERNAL MEDICINE

## 2020-11-07 PROCEDURE — 80053 COMPREHEN METABOLIC PANEL: CPT | Performed by: INTERNAL MEDICINE

## 2020-11-07 PROCEDURE — 83036 HEMOGLOBIN GLYCOSYLATED A1C: CPT | Performed by: INTERNAL MEDICINE

## 2020-11-07 PROCEDURE — 84443 ASSAY THYROID STIM HORMONE: CPT | Performed by: INTERNAL MEDICINE

## 2020-11-07 PROCEDURE — 3080F DIAST BP >= 90 MM HG: CPT | Performed by: INTERNAL MEDICINE

## 2020-11-07 PROCEDURE — 80061 LIPID PANEL: CPT | Performed by: INTERNAL MEDICINE

## 2020-11-07 PROCEDURE — 99395 PREV VISIT EST AGE 18-39: CPT | Performed by: INTERNAL MEDICINE

## 2020-11-07 PROCEDURE — 36415 COLL VENOUS BLD VENIPUNCTURE: CPT | Performed by: INTERNAL MEDICINE

## 2020-11-07 PROCEDURE — 3077F SYST BP >= 140 MM HG: CPT | Performed by: INTERNAL MEDICINE

## 2020-11-07 RX ORDER — LISINOPRIL 40 MG/1
40 TABLET ORAL DAILY
Qty: 90 TABLET | Refills: 1 | Status: SHIPPED | OUTPATIENT
Start: 2020-11-07 | End: 2022-02-16 | Stop reason: SINTOL

## 2020-11-07 RX ORDER — HYDROCHLOROTHIAZIDE 25 MG/1
25 TABLET ORAL DAILY
Qty: 90 TABLET | Refills: 1 | Status: SHIPPED | OUTPATIENT
Start: 2020-11-07 | End: 2022-02-16 | Stop reason: SINTOL

## 2020-11-07 RX ORDER — ESCITALOPRAM OXALATE 10 MG/1
10 TABLET ORAL DAILY
Qty: 90 TABLET | Refills: 0 | Status: SHIPPED | OUTPATIENT
Start: 2020-11-07 | End: 2022-02-16 | Stop reason: SINTOL

## 2020-11-07 SDOH — HEALTH STABILITY: MENTAL HEALTH: HOW OFTEN DO YOU HAVE 6 OR MORE DRINKS ON ONE OCCASION?: LESS THAN MONTHLY

## 2020-11-07 SDOH — HEALTH STABILITY: MENTAL HEALTH: HOW OFTEN DO YOU HAVE A DRINK CONTAINING ALCOHOL?: 2-4 TIMES A MONTH

## 2020-11-10 ENCOUNTER — APPOINTMENT (OUTPATIENT)
Dept: PHYSICAL THERAPY | Age: 35
End: 2020-11-10
Attending: PHYSICIAN ASSISTANT

## 2020-11-12 ENCOUNTER — APPOINTMENT (OUTPATIENT)
Dept: PHYSICAL THERAPY | Age: 35
End: 2020-11-12

## 2022-02-15 ENCOUNTER — TELEPHONE (OUTPATIENT)
Dept: INTERNAL MEDICINE | Age: 37
End: 2022-02-15

## 2022-02-16 ENCOUNTER — APPOINTMENT (OUTPATIENT)
Dept: INTERNAL MEDICINE | Age: 37
End: 2022-02-16

## 2022-02-16 ENCOUNTER — OFFICE VISIT (OUTPATIENT)
Dept: INTERNAL MEDICINE | Age: 37
End: 2022-02-16

## 2022-02-16 VITALS
HEIGHT: 65 IN | DIASTOLIC BLOOD PRESSURE: 105 MMHG | WEIGHT: 228.2 LBS | OXYGEN SATURATION: 98 % | RESPIRATION RATE: 20 BRPM | BODY MASS INDEX: 38.02 KG/M2 | HEART RATE: 112 BPM | SYSTOLIC BLOOD PRESSURE: 170 MMHG | TEMPERATURE: 98 F

## 2022-02-16 DIAGNOSIS — R07.9 CHEST PAIN, UNSPECIFIED TYPE: ICD-10-CM

## 2022-02-16 DIAGNOSIS — I10 ACCELERATED HYPERTENSION: ICD-10-CM

## 2022-02-16 DIAGNOSIS — I10 ESSENTIAL HYPERTENSION: ICD-10-CM

## 2022-02-16 DIAGNOSIS — Z91.199 NON COMPLIANCE WITH MEDICAL TREATMENT: ICD-10-CM

## 2022-02-16 DIAGNOSIS — E86.0 DEHYDRATION: ICD-10-CM

## 2022-02-16 DIAGNOSIS — B34.9 VIRAL ILLNESS: Primary | ICD-10-CM

## 2022-02-16 DIAGNOSIS — R42 DIZZINESS: ICD-10-CM

## 2022-02-16 PROCEDURE — 3077F SYST BP >= 140 MM HG: CPT | Performed by: INTERNAL MEDICINE

## 2022-02-16 PROCEDURE — 99214 OFFICE O/P EST MOD 30 MIN: CPT | Performed by: INTERNAL MEDICINE

## 2022-02-16 PROCEDURE — 3080F DIAST BP >= 90 MM HG: CPT | Performed by: INTERNAL MEDICINE

## 2022-02-16 ASSESSMENT — ANXIETY QUESTIONNAIRES
3. WORRYING TOO MUCH ABOUT DIFFERENT THINGS: NEARLY EVERY DAY
6. BECOMING EASILY ANNOYED OR IRRITABLE: NEARLY EVERY DAY
4. TROUBLE RELAXING: NEARLY EVERY DAY
3. WORRYING TOO MUCH ABOUT DIFFERENT THINGS: 3
7. FEELING AFRAID AS IF SOMETHING AWFUL MIGHT HAPPEN: NEARLY EVERY DAY
6. BECOMING EASILY ANNOYED OR IRRITABLE: 3
2. NOT BEING ABLE TO STOP OR CONTROL WORRYING: NEARLY EVERY DAY
1. FEELING NERVOUS, ANXIOUS, OR ON EDGE: 3
5. BEING SO RESTLESS THAT IT IS HARD TO SIT STILL: 3
7. FEELING AFRAID AS IF SOMETHING AWFUL MIGHT HAPPEN: 3
2. NOT BEING ABLE TO STOP OR CONTROL WORRYING: 3
GAD7 TOTAL SCORE: 21
4. TROUBLE RELAXING: 3
1. FEELING NERVOUS, ANXIOUS, OR ON EDGE: NEARLY EVERY DAY
5. BEING SO RESTLESS THAT IT IS HARD TO SIT STILL: NEARLY EVERY DAY

## 2022-02-16 ASSESSMENT — PATIENT HEALTH QUESTIONNAIRE - PHQ9
1. LITTLE INTEREST OR PLEASURE IN DOING THINGS: NEARLY EVERY DAY
CLINICAL INTERPRETATION OF PHQ2 SCORE: FURTHER SCREENING NEEDED
SUM OF ALL RESPONSES TO PHQ9 QUESTIONS 1 AND 2: 6
2. FEELING DOWN, DEPRESSED OR HOPELESS: NEARLY EVERY DAY
6. FEELING BAD ABOUT YOURSELF - OR THAT YOU ARE A FAILURE OR HAVE LET YOURSELF OR YOUR FAMILY DOWN: NEARLY EVERY DAY
9. THOUGHTS THAT YOU WOULD BE BETTER OFF DEAD, OR OF HURTING YOURSELF: SEVERAL DAYS
4. FEELING TIRED OR HAVING LITTLE ENERGY: NEARLY EVERY DAY
SUM OF ALL RESPONSES TO PHQ QUESTIONS 1-9: 25
7. TROUBLE CONCENTRATING ON THINGS, SUCH AS READING THE NEWSPAPER OR WATCHING TELEVISION: NEARLY EVERY DAY
5. POOR APPETITE, WEIGHT LOSS, OR OVEREATING: NEARLY EVERY DAY
8. MOVING OR SPEAKING SO SLOWLY THAT OTHER PEOPLE COULD HAVE NOTICED. OR THE OPPOSITE, BEING SO FIGETY OR RESTLESS THAT YOU HAVE BEEN MOVING AROUND A LOT MORE THAN USUAL: NEARLY EVERY DAY
3. TROUBLE FALLING OR STAYING ASLEEP OR SLEEPING TOO MUCH: NEARLY EVERY DAY
SUM OF ALL RESPONSES TO PHQ9 QUESTIONS 1 AND 2: 6
CLINICAL INTERPRETATION OF PHQ9 SCORE: SEVERE DEPRESSION

## 2022-02-18 ENCOUNTER — OFFICE VISIT (OUTPATIENT)
Dept: FAMILY MEDICINE CLINIC | Facility: CLINIC | Age: 37
End: 2022-02-18
Payer: COMMERCIAL

## 2022-02-18 VITALS
SYSTOLIC BLOOD PRESSURE: 148 MMHG | WEIGHT: 225.19 LBS | HEIGHT: 64.75 IN | HEART RATE: 96 BPM | TEMPERATURE: 99 F | BODY MASS INDEX: 37.98 KG/M2 | RESPIRATION RATE: 16 BRPM | DIASTOLIC BLOOD PRESSURE: 110 MMHG

## 2022-02-18 DIAGNOSIS — I10 PRIMARY HYPERTENSION: Primary | ICD-10-CM

## 2022-02-18 DIAGNOSIS — R06.83 SNORING: ICD-10-CM

## 2022-02-18 PROCEDURE — 99203 OFFICE O/P NEW LOW 30 MIN: CPT | Performed by: NURSE PRACTITIONER

## 2022-02-18 PROCEDURE — 3008F BODY MASS INDEX DOCD: CPT | Performed by: NURSE PRACTITIONER

## 2022-02-18 PROCEDURE — 3080F DIAST BP >= 90 MM HG: CPT | Performed by: NURSE PRACTITIONER

## 2022-02-18 PROCEDURE — 3077F SYST BP >= 140 MM HG: CPT | Performed by: NURSE PRACTITIONER

## 2022-02-18 RX ORDER — LOSARTAN POTASSIUM 50 MG/1
50 TABLET ORAL DAILY
Qty: 90 TABLET | Refills: 0 | Status: SHIPPED | OUTPATIENT
Start: 2022-02-18

## 2022-05-22 ENCOUNTER — TELEPHONE (OUTPATIENT)
Dept: FAMILY MEDICINE CLINIC | Facility: CLINIC | Age: 37
End: 2022-05-22

## 2022-05-25 RX ORDER — LOSARTAN POTASSIUM 50 MG/1
TABLET ORAL
Qty: 90 TABLET | Refills: 0 | Status: SHIPPED | OUTPATIENT
Start: 2022-05-25

## 2022-05-25 NOTE — TELEPHONE ENCOUNTER
Pt scheduled for 6/18/22 with Dr. Talha Dee he can only do Saturday appointments and this was the first one available.

## 2022-09-27 ENCOUNTER — TELEPHONE (OUTPATIENT)
Dept: FAMILY MEDICINE CLINIC | Facility: CLINIC | Age: 37
End: 2022-09-27

## 2022-09-27 RX ORDER — LOSARTAN POTASSIUM 50 MG/1
50 TABLET ORAL DAILY
Qty: 90 TABLET | Refills: 0 | Status: SHIPPED | OUTPATIENT
Start: 2022-09-27

## 2022-09-27 NOTE — TELEPHONE ENCOUNTER
Pt calling to ask of partial refill can be sent to pharmacy. Pt is scheduled to be seen 10/4/22 with Americo Camargo. States he has not been able to come in due to work.  [facility] in [place]

## 2022-10-04 ENCOUNTER — OFFICE VISIT (OUTPATIENT)
Dept: FAMILY MEDICINE CLINIC | Facility: CLINIC | Age: 37
End: 2022-10-04
Payer: COMMERCIAL

## 2022-10-04 VITALS
DIASTOLIC BLOOD PRESSURE: 100 MMHG | OXYGEN SATURATION: 98 % | HEART RATE: 108 BPM | HEIGHT: 64.75 IN | SYSTOLIC BLOOD PRESSURE: 132 MMHG | RESPIRATION RATE: 16 BRPM | TEMPERATURE: 98 F | WEIGHT: 232.81 LBS | BODY MASS INDEX: 39.26 KG/M2

## 2022-10-04 DIAGNOSIS — I10 PRIMARY HYPERTENSION: Primary | ICD-10-CM

## 2022-10-04 PROCEDURE — 3008F BODY MASS INDEX DOCD: CPT | Performed by: NURSE PRACTITIONER

## 2022-10-04 PROCEDURE — 3080F DIAST BP >= 90 MM HG: CPT | Performed by: NURSE PRACTITIONER

## 2022-10-04 PROCEDURE — 3075F SYST BP GE 130 - 139MM HG: CPT | Performed by: NURSE PRACTITIONER

## 2022-10-04 PROCEDURE — 99213 OFFICE O/P EST LOW 20 MIN: CPT | Performed by: NURSE PRACTITIONER

## 2022-10-04 RX ORDER — LOSARTAN POTASSIUM 100 MG/1
100 TABLET ORAL DAILY
Qty: 90 TABLET | Refills: 0 | Status: SHIPPED | OUTPATIENT
Start: 2022-10-04

## 2022-11-18 RX ORDER — LOSARTAN POTASSIUM 50 MG/1
TABLET ORAL
Qty: 90 TABLET | Refills: 0 | OUTPATIENT
Start: 2022-11-18

## 2022-11-18 NOTE — TELEPHONE ENCOUNTER
Medication Quantity Refills Start End   losartan 100 MG Oral Tab 90 tablet 0 10/4/2022      Denying refill request bc of wrong dosage

## 2023-01-28 ENCOUNTER — OFFICE VISIT (OUTPATIENT)
Dept: FAMILY MEDICINE CLINIC | Facility: CLINIC | Age: 38
End: 2023-01-28
Payer: COMMERCIAL

## 2023-01-28 VITALS
OXYGEN SATURATION: 98 % | HEART RATE: 90 BPM | TEMPERATURE: 97 F | HEIGHT: 64.57 IN | SYSTOLIC BLOOD PRESSURE: 136 MMHG | RESPIRATION RATE: 16 BRPM | WEIGHT: 236.81 LBS | BODY MASS INDEX: 39.94 KG/M2 | DIASTOLIC BLOOD PRESSURE: 89 MMHG

## 2023-01-28 DIAGNOSIS — R51.9 CHRONIC INTRACTABLE HEADACHE, UNSPECIFIED HEADACHE TYPE: ICD-10-CM

## 2023-01-28 DIAGNOSIS — G89.29 CHRONIC INTRACTABLE HEADACHE, UNSPECIFIED HEADACHE TYPE: ICD-10-CM

## 2023-01-28 DIAGNOSIS — Z00.00 LABORATORY EXAM ORDERED AS PART OF ROUTINE GENERAL MEDICAL EXAMINATION: ICD-10-CM

## 2023-01-28 DIAGNOSIS — R80.9 PROTEINURIA, UNSPECIFIED TYPE: ICD-10-CM

## 2023-01-28 DIAGNOSIS — I10 PRIMARY HYPERTENSION: Primary | ICD-10-CM

## 2023-01-28 PROCEDURE — 3075F SYST BP GE 130 - 139MM HG: CPT | Performed by: FAMILY MEDICINE

## 2023-01-28 PROCEDURE — 99214 OFFICE O/P EST MOD 30 MIN: CPT | Performed by: FAMILY MEDICINE

## 2023-01-28 PROCEDURE — 3008F BODY MASS INDEX DOCD: CPT | Performed by: FAMILY MEDICINE

## 2023-01-28 PROCEDURE — 3079F DIAST BP 80-89 MM HG: CPT | Performed by: FAMILY MEDICINE

## 2023-01-28 RX ORDER — LOSARTAN POTASSIUM 100 MG/1
100 TABLET ORAL DAILY
Qty: 90 TABLET | Refills: 1 | Status: SHIPPED | OUTPATIENT
Start: 2023-01-28

## 2023-09-24 ENCOUNTER — TELEPHONE (OUTPATIENT)
Dept: FAMILY MEDICINE CLINIC | Facility: CLINIC | Age: 38
End: 2023-09-24

## 2023-09-24 DIAGNOSIS — I10 PRIMARY HYPERTENSION: ICD-10-CM

## 2023-09-25 NOTE — TELEPHONE ENCOUNTER
Requested Prescriptions     Pending Prescriptions Disp Refills    LOSARTAN 100 MG Oral Tab [Pharmacy Med Name: LOSARTAN 100MG TABLETS] 90 tablet 1     Sig: TAKE 1 TABLET(100 MG) BY MOUTH DAILY     LOV 1/28/2023     Patient was asked to follow-up in:     Appointment scheduled: Visit date not found     Medication refilled per protocol.     Routed to , per Dr Abdullahi Swanson would like to be seen

## 2023-09-29 ENCOUNTER — TELEPHONE (OUTPATIENT)
Dept: FAMILY MEDICINE CLINIC | Facility: CLINIC | Age: 38
End: 2023-09-29

## 2023-09-29 DIAGNOSIS — I10 PRIMARY HYPERTENSION: Primary | ICD-10-CM

## 2023-09-29 DIAGNOSIS — Z00.00 LABORATORY EXAM ORDERED AS PART OF ROUTINE GENERAL MEDICAL EXAMINATION: ICD-10-CM

## 2023-09-29 RX ORDER — LOSARTAN POTASSIUM 100 MG/1
100 TABLET ORAL DAILY
Qty: 90 TABLET | Refills: 0 | Status: SHIPPED | OUTPATIENT
Start: 2023-09-29

## 2023-09-29 NOTE — TELEPHONE ENCOUNTER
Please enter lab orders for the patient's upcoming physical appointment. Physical scheduled: Your appointments       Date & Time Appointment Department [facility])    Nov 01, 2023 12:00 PM CDT Physical - Established with Lety Babb MD [address], [address] [address], [place] ([address])              [facility] [address] Mila Gordon [address] [address] [facility] [id number]           Preferred lab: [facility])     The patient has been notified to complete fasting labs prior to their physical appointment.

## 2023-11-27 ENCOUNTER — OFFICE VISIT (OUTPATIENT)
Dept: FAMILY MEDICINE CLINIC | Facility: CLINIC | Age: 38
End: 2023-11-27
Payer: COMMERCIAL

## 2023-11-27 VITALS
WEIGHT: 247.13 LBS | DIASTOLIC BLOOD PRESSURE: 88 MMHG | SYSTOLIC BLOOD PRESSURE: 135 MMHG | HEART RATE: 87 BPM | RESPIRATION RATE: 16 BRPM | HEIGHT: 64.57 IN | BODY MASS INDEX: 41.68 KG/M2 | OXYGEN SATURATION: 96 %

## 2023-11-27 DIAGNOSIS — I10 PRIMARY HYPERTENSION: ICD-10-CM

## 2023-11-27 DIAGNOSIS — Z00.00 ANNUAL PHYSICAL EXAM: Primary | ICD-10-CM

## 2023-11-27 PROCEDURE — 3075F SYST BP GE 130 - 139MM HG: CPT | Performed by: FAMILY MEDICINE

## 2023-11-27 PROCEDURE — 3079F DIAST BP 80-89 MM HG: CPT | Performed by: FAMILY MEDICINE

## 2023-11-27 PROCEDURE — 3008F BODY MASS INDEX DOCD: CPT | Performed by: FAMILY MEDICINE

## 2023-11-27 PROCEDURE — 99395 PREV VISIT EST AGE 18-39: CPT | Performed by: FAMILY MEDICINE

## 2023-11-27 RX ORDER — LOSARTAN POTASSIUM 100 MG/1
100 TABLET ORAL DAILY
Qty: 90 TABLET | Refills: 3 | Status: SHIPPED | OUTPATIENT
Start: 2023-11-27

## 2024-11-04 ENCOUNTER — TELEPHONE (OUTPATIENT)
Dept: FAMILY MEDICINE CLINIC | Facility: CLINIC | Age: 39
End: 2024-11-04

## 2024-11-04 DIAGNOSIS — Z00.00 LABORATORY EXAM ORDERED AS PART OF ROUTINE GENERAL MEDICAL EXAMINATION: Primary | ICD-10-CM

## 2024-11-04 NOTE — TELEPHONE ENCOUNTER
Please enter lab orders for the patient's upcoming physical appointment.     Physical scheduled:   Your appointments       Date & Time Appointment Department ([place])    Dec 02, 2024 9:00 AM CST Physical - Established with Silverio Ledezma MD [facility] [place] ([facility])              [facility]  [address]  [facility] [address]  [phone]           Preferred lab: [facility] ([facility])     The patient has been notified to complete fasting labs prior to their physical appointment.

## 2025-08-23 ENCOUNTER — HOSPITAL ENCOUNTER (OUTPATIENT)
Age: 40
Discharge: HOME | End: 2025-08-23

## 2025-08-23 VITALS
WEIGHT: 250 LBS | SYSTOLIC BLOOD PRESSURE: 144 MMHG | RESPIRATION RATE: 20 BRPM | TEMPERATURE: 98 F | HEART RATE: 89 BPM | BODY MASS INDEX: 42 KG/M2 | OXYGEN SATURATION: 98 % | DIASTOLIC BLOOD PRESSURE: 86 MMHG

## 2025-08-23 DIAGNOSIS — S05.01XA ABRASION OF RIGHT CORNEA, INITIAL ENCOUNTER: Primary | ICD-10-CM

## 2025-08-23 RX ORDER — HYDROCHLOROTHIAZIDE 25 MG/1
25 TABLET ORAL DAILY
COMMUNITY
Start: 2025-08-02

## 2025-08-23 RX ORDER — HYDROCODONE BITARTRATE AND ACETAMINOPHEN 5; 325 MG/1; MG/1
1 TABLET ORAL EVERY 6 HOURS PRN
Qty: 20 TABLET | Refills: 0 | Status: SHIPPED | OUTPATIENT
Start: 2025-08-23

## 2025-08-23 RX ORDER — TETRACAINE HYDROCHLORIDE 5 MG/ML
1 SOLUTION OPHTHALMIC ONCE
Status: COMPLETED | OUTPATIENT
Start: 2025-08-23 | End: 2025-08-23

## 2025-08-23 RX ORDER — CIPROFLOXACIN HYDROCHLORIDE 3.5 MG/ML
1 SOLUTION/ DROPS TOPICAL
Qty: 5 ML | Refills: 0 | Status: SHIPPED | OUTPATIENT
Start: 2025-08-23 | End: 2025-08-28

## 2025-08-23 RX ORDER — AMLODIPINE BESYLATE 5 MG/1
5 TABLET ORAL DAILY
COMMUNITY
Start: 2025-08-02

## 2025-08-23 RX ORDER — FLUORESCEIN SODIUM 1 MG/MG
1 STRIP OPHTHALMIC ONCE
Status: COMPLETED | OUTPATIENT
Start: 2025-08-23 | End: 2025-08-23